# Patient Record
Sex: MALE | Race: WHITE | NOT HISPANIC OR LATINO
[De-identification: names, ages, dates, MRNs, and addresses within clinical notes are randomized per-mention and may not be internally consistent; named-entity substitution may affect disease eponyms.]

---

## 2020-05-13 ENCOUNTER — TRANSCRIPTION ENCOUNTER (OUTPATIENT)
Age: 40
End: 2020-05-13

## 2020-12-19 ENCOUNTER — TRANSCRIPTION ENCOUNTER (OUTPATIENT)
Age: 40
End: 2020-12-19

## 2021-05-05 ENCOUNTER — TRANSCRIPTION ENCOUNTER (OUTPATIENT)
Age: 41
End: 2021-05-05

## 2021-11-29 ENCOUNTER — TRANSCRIPTION ENCOUNTER (OUTPATIENT)
Age: 41
End: 2021-11-29

## 2022-04-02 ENCOUNTER — TRANSCRIPTION ENCOUNTER (OUTPATIENT)
Age: 42
End: 2022-04-02

## 2022-04-14 ENCOUNTER — TRANSCRIPTION ENCOUNTER (OUTPATIENT)
Age: 42
End: 2022-04-14

## 2022-12-04 ENCOUNTER — NON-APPOINTMENT (OUTPATIENT)
Age: 42
End: 2022-12-04

## 2023-05-24 ENCOUNTER — NON-APPOINTMENT (OUTPATIENT)
Age: 43
End: 2023-05-24

## 2023-05-30 PROBLEM — Z00.00 ENCOUNTER FOR PREVENTIVE HEALTH EXAMINATION: Status: ACTIVE | Noted: 2023-05-30

## 2023-06-03 ENCOUNTER — NON-APPOINTMENT (OUTPATIENT)
Age: 43
End: 2023-06-03

## 2023-06-08 ENCOUNTER — NON-APPOINTMENT (OUTPATIENT)
Age: 43
End: 2023-06-08

## 2023-06-08 ENCOUNTER — APPOINTMENT (OUTPATIENT)
Dept: INTERNAL MEDICINE | Facility: CLINIC | Age: 43
End: 2023-06-08
Payer: COMMERCIAL

## 2023-06-08 VITALS
OXYGEN SATURATION: 99 % | SYSTOLIC BLOOD PRESSURE: 132 MMHG | DIASTOLIC BLOOD PRESSURE: 82 MMHG | HEIGHT: 70 IN | TEMPERATURE: 98 F | WEIGHT: 200 LBS | BODY MASS INDEX: 28.63 KG/M2 | HEART RATE: 84 BPM

## 2023-06-08 DIAGNOSIS — A69.20 LYME DISEASE, UNSPECIFIED: ICD-10-CM

## 2023-06-08 DIAGNOSIS — Z87.891 PERSONAL HISTORY OF NICOTINE DEPENDENCE: ICD-10-CM

## 2023-06-08 DIAGNOSIS — Z83.518 FAMILY HISTORY OF OTHER SPECIFIED EYE DISORDER: ICD-10-CM

## 2023-06-08 PROCEDURE — 99205 OFFICE O/P NEW HI 60 MIN: CPT | Mod: 25

## 2023-06-08 PROCEDURE — 36415 COLL VENOUS BLD VENIPUNCTURE: CPT

## 2023-06-08 PROCEDURE — 93000 ELECTROCARDIOGRAM COMPLETE: CPT

## 2023-06-09 LAB
ALBUMIN SERPL ELPH-MCNC: 4.6 G/DL
ALP BLD-CCNC: 49 U/L
ALT SERPL-CCNC: 37 U/L
ANION GAP SERPL CALC-SCNC: 9 MMOL/L
AST SERPL-CCNC: 21 U/L
BILIRUB SERPL-MCNC: 0.2 MG/DL
BUN SERPL-MCNC: 14 MG/DL
CALCIUM SERPL-MCNC: 9.3 MG/DL
CHLORIDE SERPL-SCNC: 105 MMOL/L
CO2 SERPL-SCNC: 27 MMOL/L
CREAT SERPL-MCNC: 0.79 MG/DL
CRP SERPL-MCNC: <3 MG/L
EGFR: 114 ML/MIN/1.73M2
ERYTHROCYTE [SEDIMENTATION RATE] IN BLOOD BY WESTERGREN METHOD: 2 MM/HR
GLUCOSE SERPL-MCNC: 86 MG/DL
POTASSIUM SERPL-SCNC: 4.6 MMOL/L
PROT SERPL-MCNC: 6.5 G/DL
SODIUM SERPL-SCNC: 142 MMOL/L

## 2023-06-09 RX ORDER — DOXYCYCLINE HYCLATE 100 MG/1
100 CAPSULE ORAL TWICE DAILY
Qty: 14 | Refills: 0 | Status: ACTIVE | COMMUNITY
Start: 2023-06-09 | End: 1900-01-01

## 2023-06-13 ENCOUNTER — APPOINTMENT (OUTPATIENT)
Dept: INTERNAL MEDICINE | Facility: CLINIC | Age: 43
End: 2023-06-13

## 2023-06-14 ENCOUNTER — OFFICE (OUTPATIENT)
Dept: URBAN - METROPOLITAN AREA CLINIC 63 | Facility: CLINIC | Age: 43
Setting detail: OPHTHALMOLOGY
End: 2023-06-14
Payer: COMMERCIAL

## 2023-06-14 DIAGNOSIS — H43.393: ICD-10-CM

## 2023-06-14 DIAGNOSIS — G43.809: ICD-10-CM

## 2023-06-14 PROCEDURE — 92004 COMPRE OPH EXAM NEW PT 1/>: CPT | Performed by: STUDENT IN AN ORGANIZED HEALTH CARE EDUCATION/TRAINING PROGRAM

## 2023-06-14 ASSESSMENT — AXIALLENGTH_DERIVED
OS_AL: 23.1308
OD_AL: 23.2194

## 2023-06-14 ASSESSMENT — TONOMETRY
OS_IOP_MMHG: 20
OD_IOP_MMHG: 21

## 2023-06-14 ASSESSMENT — CONFRONTATIONAL VISUAL FIELD TEST (CVF)
OD_FINDINGS: FULL
OS_FINDINGS: FULL

## 2023-06-14 ASSESSMENT — VISUAL ACUITY
OS_BCVA: 20/20
OD_BCVA: 20/20

## 2023-06-14 ASSESSMENT — SPHEQUIV_DERIVED
OS_SPHEQUIV: 0.5
OD_SPHEQUIV: 0.5

## 2023-06-14 ASSESSMENT — REFRACTION_AUTOREFRACTION
OD_CYLINDER: -0.50
OS_SPHERE: +0.75
OD_SPHERE: +0.75
OS_AXIS: 109
OS_CYLINDER: -0.50
OD_AXIS: 087

## 2023-06-14 ASSESSMENT — KERATOMETRY
OS_K1POWER_DIOPTERS: 44.00
OS_AXISANGLE_DEGREES: 019
OD_K1POWER_DIOPTERS: 43.75
OD_K2POWER_DIOPTERS: 44.25
OS_K2POWER_DIOPTERS: 44.50
OD_AXISANGLE_DEGREES: 001

## 2023-06-15 ENCOUNTER — NON-APPOINTMENT (OUTPATIENT)
Age: 43
End: 2023-06-15

## 2023-06-16 ENCOUNTER — EMERGENCY (EMERGENCY)
Facility: HOSPITAL | Age: 43
LOS: 1 days | Discharge: HOME | End: 2023-06-16
Attending: STUDENT IN AN ORGANIZED HEALTH CARE EDUCATION/TRAINING PROGRAM | Admitting: STUDENT IN AN ORGANIZED HEALTH CARE EDUCATION/TRAINING PROGRAM
Payer: COMMERCIAL

## 2023-06-16 VITALS
OXYGEN SATURATION: 97 % | HEART RATE: 89 BPM | RESPIRATION RATE: 18 BRPM | SYSTOLIC BLOOD PRESSURE: 131 MMHG | TEMPERATURE: 98 F | DIASTOLIC BLOOD PRESSURE: 56 MMHG

## 2023-06-16 PROBLEM — A69.20 LYME DISEASE: Status: ACTIVE | Noted: 2023-06-08

## 2023-06-16 PROBLEM — Z87.891 FORMER SMOKER: Status: ACTIVE | Noted: 2023-06-16

## 2023-06-16 PROBLEM — Z83.518 FAMILY HISTORY OF MACULAR DEGENERATION: Status: ACTIVE | Noted: 2023-06-16

## 2023-06-16 LAB
ALBUMIN SERPL ELPH-MCNC: 4.1 G/DL — SIGNIFICANT CHANGE UP (ref 3.3–5)
ALP SERPL-CCNC: 52 U/L — SIGNIFICANT CHANGE UP (ref 30–120)
ALT FLD-CCNC: 52 U/L DA — SIGNIFICANT CHANGE UP (ref 10–60)
ANION GAP SERPL CALC-SCNC: 7 MMOL/L — SIGNIFICANT CHANGE UP (ref 5–17)
APTT BLD: 30.5 SEC — SIGNIFICANT CHANGE UP (ref 27.5–35.5)
AST SERPL-CCNC: 24 U/L — SIGNIFICANT CHANGE UP (ref 10–40)
BASOPHILS # BLD AUTO: 0.03 K/UL — SIGNIFICANT CHANGE UP (ref 0–0.2)
BASOPHILS NFR BLD AUTO: 0.3 % — SIGNIFICANT CHANGE UP (ref 0–2)
BILIRUB SERPL-MCNC: 0.4 MG/DL — SIGNIFICANT CHANGE UP (ref 0.2–1.2)
BUN SERPL-MCNC: 12 MG/DL — SIGNIFICANT CHANGE UP (ref 7–23)
CALCIUM SERPL-MCNC: 8.8 MG/DL — SIGNIFICANT CHANGE UP (ref 8.4–10.5)
CHLORIDE SERPL-SCNC: 103 MMOL/L — SIGNIFICANT CHANGE UP (ref 96–108)
CO2 SERPL-SCNC: 28 MMOL/L — SIGNIFICANT CHANGE UP (ref 22–31)
CREAT SERPL-MCNC: 0.82 MG/DL — SIGNIFICANT CHANGE UP (ref 0.5–1.3)
EGFR: 113 ML/MIN/1.73M2 — SIGNIFICANT CHANGE UP
EOSINOPHIL # BLD AUTO: 0.16 K/UL — SIGNIFICANT CHANGE UP (ref 0–0.5)
EOSINOPHIL NFR BLD AUTO: 1.6 % — SIGNIFICANT CHANGE UP (ref 0–6)
GLUCOSE BLDC GLUCOMTR-MCNC: 100 MG/DL — HIGH (ref 70–99)
GLUCOSE SERPL-MCNC: 105 MG/DL — HIGH (ref 70–99)
HCT VFR BLD CALC: 43.4 % — SIGNIFICANT CHANGE UP (ref 39–50)
HGB BLD-MCNC: 14.5 G/DL — SIGNIFICANT CHANGE UP (ref 13–17)
IMM GRANULOCYTES NFR BLD AUTO: 0.3 % — SIGNIFICANT CHANGE UP (ref 0–0.9)
INR BLD: 1.05 RATIO — SIGNIFICANT CHANGE UP (ref 0.88–1.16)
LYMPHOCYTES # BLD AUTO: 2.33 K/UL — SIGNIFICANT CHANGE UP (ref 1–3.3)
LYMPHOCYTES # BLD AUTO: 23.9 % — SIGNIFICANT CHANGE UP (ref 13–44)
MAGNESIUM SERPL-MCNC: 2.1 MG/DL — SIGNIFICANT CHANGE UP (ref 1.6–2.6)
MCHC RBC-ENTMCNC: 29.5 PG — SIGNIFICANT CHANGE UP (ref 27–34)
MCHC RBC-ENTMCNC: 33.4 GM/DL — SIGNIFICANT CHANGE UP (ref 32–36)
MCV RBC AUTO: 88.4 FL — SIGNIFICANT CHANGE UP (ref 80–100)
MONOCYTES # BLD AUTO: 0.48 K/UL — SIGNIFICANT CHANGE UP (ref 0–0.9)
MONOCYTES NFR BLD AUTO: 4.9 % — SIGNIFICANT CHANGE UP (ref 2–14)
NEUTROPHILS # BLD AUTO: 6.73 K/UL — SIGNIFICANT CHANGE UP (ref 1.8–7.4)
NEUTROPHILS NFR BLD AUTO: 69 % — SIGNIFICANT CHANGE UP (ref 43–77)
NRBC # BLD: 0 /100 WBCS — SIGNIFICANT CHANGE UP (ref 0–0)
PLATELET # BLD AUTO: 261 K/UL — SIGNIFICANT CHANGE UP (ref 150–400)
POTASSIUM SERPL-MCNC: 4.1 MMOL/L — SIGNIFICANT CHANGE UP (ref 3.5–5.3)
POTASSIUM SERPL-SCNC: 4.1 MMOL/L — SIGNIFICANT CHANGE UP (ref 3.5–5.3)
PROT SERPL-MCNC: 7.3 G/DL — SIGNIFICANT CHANGE UP (ref 6–8.3)
PROTHROM AB SERPL-ACNC: 12.4 SEC — SIGNIFICANT CHANGE UP (ref 10.5–13.4)
RBC # BLD: 4.91 M/UL — SIGNIFICANT CHANGE UP (ref 4.2–5.8)
RBC # FLD: 12.7 % — SIGNIFICANT CHANGE UP (ref 10.3–14.5)
SODIUM SERPL-SCNC: 138 MMOL/L — SIGNIFICANT CHANGE UP (ref 135–145)
WBC # BLD: 9.76 K/UL — SIGNIFICANT CHANGE UP (ref 3.8–10.5)
WBC # FLD AUTO: 9.76 K/UL — SIGNIFICANT CHANGE UP (ref 3.8–10.5)

## 2023-06-16 PROCEDURE — 70498 CT ANGIOGRAPHY NECK: CPT | Mod: 26,MA

## 2023-06-16 PROCEDURE — 85730 THROMBOPLASTIN TIME PARTIAL: CPT

## 2023-06-16 PROCEDURE — 99285 EMERGENCY DEPT VISIT HI MDM: CPT | Mod: 25

## 2023-06-16 PROCEDURE — 70496 CT ANGIOGRAPHY HEAD: CPT | Mod: 26,MA

## 2023-06-16 PROCEDURE — 70450 CT HEAD/BRAIN W/O DYE: CPT | Mod: 26,MA,59

## 2023-06-16 PROCEDURE — 85610 PROTHROMBIN TIME: CPT

## 2023-06-16 PROCEDURE — 85025 COMPLETE CBC W/AUTO DIFF WBC: CPT

## 2023-06-16 PROCEDURE — 82962 GLUCOSE BLOOD TEST: CPT

## 2023-06-16 PROCEDURE — 93005 ELECTROCARDIOGRAM TRACING: CPT

## 2023-06-16 PROCEDURE — 83735 ASSAY OF MAGNESIUM: CPT

## 2023-06-16 PROCEDURE — 70498 CT ANGIOGRAPHY NECK: CPT | Mod: MA

## 2023-06-16 PROCEDURE — 80053 COMPREHEN METABOLIC PANEL: CPT

## 2023-06-16 PROCEDURE — 70450 CT HEAD/BRAIN W/O DYE: CPT | Mod: MA

## 2023-06-16 PROCEDURE — 36415 COLL VENOUS BLD VENIPUNCTURE: CPT

## 2023-06-16 PROCEDURE — 86780 TREPONEMA PALLIDUM: CPT

## 2023-06-16 PROCEDURE — 93010 ELECTROCARDIOGRAM REPORT: CPT

## 2023-06-16 PROCEDURE — 96360 HYDRATION IV INFUSION INIT: CPT | Mod: XU

## 2023-06-16 PROCEDURE — 99285 EMERGENCY DEPT VISIT HI MDM: CPT

## 2023-06-16 PROCEDURE — 70496 CT ANGIOGRAPHY HEAD: CPT | Mod: MA

## 2023-06-16 RX ORDER — GABAPENTIN 400 MG/1
1 CAPSULE ORAL
Qty: 90 | Refills: 0
Start: 2023-06-16 | End: 2023-07-15

## 2023-06-16 RX ORDER — SODIUM CHLORIDE 9 MG/ML
1000 INJECTION INTRAMUSCULAR; INTRAVENOUS; SUBCUTANEOUS ONCE
Refills: 0 | Status: COMPLETED | OUTPATIENT
Start: 2023-06-16 | End: 2023-06-16

## 2023-06-16 RX ADMIN — SODIUM CHLORIDE 1000 MILLILITER(S): 9 INJECTION INTRAMUSCULAR; INTRAVENOUS; SUBCUTANEOUS at 10:53

## 2023-06-16 RX ADMIN — SODIUM CHLORIDE 1000 MILLILITER(S): 9 INJECTION INTRAMUSCULAR; INTRAVENOUS; SUBCUTANEOUS at 12:03

## 2023-06-16 NOTE — ED PROVIDER NOTE - CARE PROVIDER_API CALL
Jorge Luis Cee  Neurology  [address] [place] [address]  Phone: [phone]  Fax: [phone]  Follow Up Time: 1-3 Days

## 2023-06-16 NOTE — CONSULT NOTE ADULT - SUBJECTIVE AND OBJECTIVE BOX
Patient is a 42y old  Male who presents with a chief complaint of Paresthesia    HPI:  42-year-old male history of Lyme disease here complaining of 1 week of bilateral arm and leg numbness and tingling.  All 4 extremities sensation starts midway up extremity and extends down distally.  Patient also with central facial numbness and right-sided head pressure behind right eye.  Patient reports 3 weeks ago having headache facial spasm and felt similar to when he had Lyme disease in the past.  He went to urgent care had a positive Lyme test was started on doxycycline which she has been taking for about 3 weeks with 3 days of treatment left.  Patient reports she went to a [facility] infectious disease doctor who said she did not think it was Lyme disease but patient finished antibiotics.  Patient denies fevers and chills, chest pain, shortness of breath, nausea, vomiting, abdominal pain, bowel or bladder incontinence or retention, saddle anesthesia. No difficulty speaking or word finding. Patient with chronic back pain that he feels is slightly worse however no reported acute injury or trauma.      PAST MEDICAL & SURGICAL HISTORY: Lyme's disease    Allergies    No Known Allergies    SOCIAL HISTORY:    No h/o Smoking.   No h/o alcohol use.    FAMILY HISTORY:    REVIEW OF SYSTEMS:    CONSTITUTIONAL: No fever  EYES: No eye pain,   ENMT:  No sinus or throat pain  NECK: No pain or stiffness  RESPIRATORY: No cough, No hemoptysis; No shortness of breath  CARDIOVASCULAR: No acute chest pain, palpitations,  or leg swelling  GASTROINTESTINAL: No abdominal pain. No nausea, vomiting, or hematemesis;  No melena or hematochezia.  GENITOURINARY: No  hematuria, or incontinence  MUSCULOSKELETAL: No joint swelling; No extremity pain  SKIN: No itching, rashes, or lesions   LYMPH NODES: No enlarged glands  NEUROLOGICAL: No headaches, memory loss,   PSYCHIATRIC: No depression, anxiety, mood swings, or difficulty sleeping  ENDOCRINE: No heat or cold intolerance;   HEME/LYMPH: No easy bruising, or bleeding gums  Allergy/Immunology. No medication allergy. No seasonal allergies.    PHYSICAL EXAM:  Vital Signs Last 24 Hrs  T(F): 98.1 (06-16-23 @ 09:26)  HR: 89 (06-16-23 @ 09:26)  BP: 131/56 (06-16-23 @ 09:26)  RR: 18 (06-16-23 @ 09:26)    GENERAL: NAD, well-groomed, well-developed  HEAD:  Atraumatic, Normocephalic  EYES: EOMI, PERRLA, conjunctiva and sclera clear  NECK: Supple, No JVD, thyroid non-palpable    On Neurological Examination:    Mental Status - Pt is alert, awake, oriented X3. Higher functions are intact. Pt. does have mild poor cognition. Follows commands well and able to answer questions appropriately.    Speech -  Normal. Slurred. Pt has no aphasia.    Cranial Nerves - Pupils 3 mm equal and reactive to light, extraocular eye movements intact. Pt has no visual field deficit.  Pt has no right left facial asymmetry. Tongue - is in midline.    Motor Exam - 4 plus/5 all over, No drift. No shaking or tremors.  Muscle tone - is normal all over. Moves all extremities equally. No asymmetry is seen.      Sensory Exam - Pin prick, temperature, joint position and vibration are intact on either side. Pt withdraws all extremities equally on stimulation. No asymmetry seen. No complaints of tingling, numbness.    Gait - Able to stand and walk unassisted. Pt is able to stand up with holding my hands and is able to walk for few feet around the bed. Not falling to either side.    Deep tendon Reflexes - 2 plus all over.    Coordination - Fine finger movements are normal on both sides. Finger to nose is also normal on both sides.       Romberg - Negative.    Neck Supple -  Yes.    LABS:                        14.5   9.76  )-----------( 261      ( 16 Jun 2023 10:25 )             43.4     06-16    138  |  103  |  12  ----------------------------<  105<H>  4.1   |  28  |  0.82    Ca    8.8      16 Jun 2023 10:25  Mg     2.1     06-16    TPro  7.3  /  Alb  4.1  /  TBili  0.4  /  DBili  x   /  AST  24  /  ALT  52  /  AlkPhos  52  06-16    PT/INR - ( 16 Jun 2023 10:25 )   PT: 12.4 sec;   INR: 1.05 ratio         PTT - ( 16 Jun 2023 10:25 )  PTT:30.5 sec      RADIOLOGY & ADDITIONAL STUDIES:    < from: CT Angio Head w/ IV Cont (06.16.23 @ 10:41) >    CT angiography neck: No hemodynamically significant stenosis of the bilateral cervical ICAs using NASCET criteria.  Patent vertebral arteries.  No evidence of vascular dissection.    CT angiography brain: No large vessel occlusion. No evidence of aneurysm.    < end of copied text >    < from: CT Head No Cont (06.16.23 @ 10:40) >    IMPRESSION:    No acute intracranial hemorrhage or mass effect.  Polypoid lesions in the right nasal cavity.     < end of copied text >     Patient is a 42y old  Male who presents with a chief complaint of Paresthesia    HPI:  42-year-old male history of Lyme disease here complaining of 1 week of bilateral arm and leg numbness and tingling.  All 4 extremities sensation starts midway up extremity and extends down distally.  Patient also with central facial numbness and right-sided head pressure behind right eye.  Patient reports 3 weeks ago having headache facial spasm and felt similar to when he had Lyme disease in the past.  He went to urgent care had a positive Lyme test was started on doxycycline which she has been taking for about 3 weeks with 3 days of treatment left.  Patient reports she went to a [facility] infectious disease doctor who said she did not think it was Lyme disease but patient finished antibiotics.  Patient denies fevers and chills, chest pain, shortness of breath, nausea, vomiting, abdominal pain, bowel or bladder incontinence or retention, saddle anesthesia. No difficulty speaking or word finding. Patient with chronic back pain that he feels is slightly worse however no reported acute injury or trauma.      PAST MEDICAL & SURGICAL HISTORY: Lyme's disease    Allergies    No Known Allergies    SOCIAL HISTORY:    No h/o Smoking.   No h/o alcohol use.    FAMILY HISTORY:    REVIEW OF SYSTEMS:    CONSTITUTIONAL: No fever  EYES: No eye pain,   ENMT:  No sinus or throat pain  NECK: No pain or stiffness  RESPIRATORY: No cough, No hemoptysis; No shortness of breath  CARDIOVASCULAR: No acute chest pain, palpitations,  or leg swelling  GASTROINTESTINAL: No abdominal pain. No nausea, vomiting, or hematemesis;  No melena or hematochezia.  GENITOURINARY: No  hematuria, or incontinence  MUSCULOSKELETAL: No joint swelling; No extremity pain  SKIN: No itching, rashes, or lesions   LYMPH NODES: No enlarged glands  NEUROLOGICAL: No headaches, memory loss,   PSYCHIATRIC: No depression, anxiety, mood swings, or difficulty sleeping  ENDOCRINE: No heat or cold intolerance;   HEME/LYMPH: No easy bruising, or bleeding gums  Allergy/Immunology. No medication allergy. No seasonal allergies.    PHYSICAL EXAM:  Vital Signs Last 24 Hrs  T(F): 98.1 (06-16-23 @ 09:26)  HR: 89 (06-16-23 @ 09:26)  BP: 131/56 (06-16-23 @ 09:26)  RR: 18 (06-16-23 @ 09:26)    GENERAL: NAD, well-groomed, well-developed  HEAD:  Atraumatic, Normocephalic  EYES: EOMI, PERRLA, conjunctiva and sclera clear  NECK: Supple, No JVD    On Neurological Examination:    Mental Status - Pt is alert, awake, oriented X3. Higher functions are intact. Follows commands well and able to answer questions appropriately.    Speech -  Normal.  Pt has no aphasia.    Cranial Nerves - Pupils 3 mm equal and reactive to light, extraocular eye movements intact. Pt has no visual field deficit.  No facial asymmetry. Tongue - is in midline.    Motor Exam - 4 plus/5 all over, No drift. No shaking or tremors.    Sensory Exam - Pin prick, temperature, joint position and vibration are intact on either side.    Gait - Able to stand and walk unassisted. Pt is able to stand up with holding my hands and is able to walk for few feet around the bed. Not falling to either side.    Deep tendon Reflexes - 2 plus all over.    Coordination - Fine finger movements are normal on both sides. Finger to nose is also normal on both sides.       Romberg - Negative.    Neck Supple -  Yes.    LABS:                        14.5   9.76  )-----------( 261      ( 16 Jun 2023 10:25 )             43.4     06-16    138  |  103  |  12  ----------------------------<  105<H>  4.1   |  28  |  0.82    Ca    8.8      16 Jun 2023 10:25  Mg     2.1     06-16    TPro  7.3  /  Alb  4.1  /  TBili  0.4  /  DBili  x   /  AST  24  /  ALT  52  /  AlkPhos  52  06-16    PT/INR - ( 16 Jun 2023 10:25 )   PT: 12.4 sec;   INR: 1.05 ratio         PTT - ( 16 Jun 2023 10:25 )  PTT:30.5 sec      RADIOLOGY & ADDITIONAL STUDIES:    < from: CT Angio Head w/ IV Cont (06.16.23 @ 10:41) >    CT angiography neck: No hemodynamically significant stenosis of the bilateral cervical ICAs using NASCET criteria.  Patent vertebral arteries.  No evidence of vascular dissection.    CT angiography brain: No large vessel occlusion. No evidence of aneurysm.    < end of copied text >    < from: CT Head No Cont (06.16.23 @ 10:40) >    IMPRESSION:    No acute intracranial hemorrhage or mass effect.  Polypoid lesions in the right nasal cavity.     < end of copied text >

## 2023-06-16 NOTE — ED PROVIDER NOTE - PHYSICAL EXAMINATION
Vital signs as available reviewed.  General:  No acute distress.  Head:  Normocephalic, atraumatic.  Eyes:  Conjunctiva pink, no icterus.  Cardiovascular:  Regular rate, no obvious murmur.  Respiratory:  Clear to auscultation, good air entry bilaterally.  Abdomen:  Soft, non-tender.  Musculoskeletal:  No midline spinal step offs, skin lesions. lower extremity strength 5/5 in flexion and extension of great toes, ankle, knees, hips. straight leg raise negative. Sensation intact.   Neurologic: Alert and oriented, follows commands, extra-occular movements intact, no visual fields defect, no facial asymmetry, no upper extremity drift, no lower extremity drift, rapid alternating movements performed without difficulty, no sensory deficit, no aphasia, no dysarthria, no inattention.   Skin:  Warm and dry.

## 2023-06-16 NOTE — PHYSICAL EXAM
[General Appearance - Alert] : alert [General Appearance - In No Acute Distress] : in no acute distress [General Appearance - Well Nourished] : well nourished [General Appearance - Well-Appearing] : healthy appearing [Sclera] : the sclera and conjunctiva were normal [PERRL With Normal Accommodation] : pupils were equal in size, round, reactive to light [Outer Ear] : the ears and nose were normal in appearance [Examination Of The Oral Cavity] : the lips and gums were normal [Oropharynx] : the oropharynx was normal with no thrush [Neck Appearance] : the appearance of the neck was normal [Neck Cervical Mass (___cm)] : no neck mass was observed [Respiration, Rhythm And Depth] : normal respiratory rhythm and effort [Exaggerated Use Of Accessory Muscles For Inspiration] : no accessory muscle use [Auscultation Breath Sounds / Voice Sounds] : lungs were clear to auscultation bilaterally [Heart Rate And Rhythm] : heart rate was normal and rhythm regular [Heart Sounds] : normal S1 and S2 [Murmurs] : no murmurs [Heart Sounds Pericardial Friction Rub] : no pericardial rub [Edema] : there was no peripheral edema [Abdomen Soft] : soft [Abdomen Tenderness] : non-tender [Abdomen Mass (___ Cm)] : no abdominal mass palpated [No Palpable Adenopathy] : no palpable adenopathy [Musculoskeletal - Swelling] : no joint swelling [Range of Motion to Joints] : range of motion to joints [Nail Clubbing] : no clubbing  or cyanosis of the fingernails [Motor Tone] : muscle strength and tone were normal [Skin Color & Pigmentation] : normal skin color and pigmentation [] : no rash [Skin Lesions] : no skin lesions [Cranial Nerves] : cranial nerves 2-12 were intact [Motor Exam] : the motor exam was normal [No Focal Deficits] : no focal deficits [Oriented To Time, Place, And Person] : oriented to person, place, and time [Affect] : the affect was normal

## 2023-06-16 NOTE — HISTORY OF PRESENT ILLNESS
[FreeTextEntry1] : 41 y/o M with history of Lyme disease in Aug 2020, presents for concern of Lyme reinfection. \par \par In Aug 2020, he developed bull-eye rash, left ankle painful, HA, back pain. He was treated with doxy for 3 weeks with resolution of symptoms \par \par Now presents with 4-6 weeks history of back stiffness, joint pain, left ankle, and right eye floaters. He felt symptoms were very similar to his previous episode, so he went to Urgent Care for testing. He was prescribed doxycycline (will complete 2 weeks on 6/12)\par \par PMH: Lyme in 2020 \par PSH: nose polyps. wisdom teeth \par \par Meds: doxycyline, finasteride  \par \par Allergy to Keflex - reported as a baby,  unknown reaction \par \par Former smoker (stopped Jan 2023)\par Social drinker\par \par Spending lots of time outdoors in [facility] \par

## 2023-06-16 NOTE — ED PROVIDER NOTE - NSFOLLOWUPINSTRUCTIONS_ED_ALL_ED_FT
Please follow up with your Primary Care Physician and any specialists as discussed- Dr. Cee  Please take your medications as prescribed and or instructed.  If your symptoms persist or worsen, please seek care. Either return to the Emergency Department, go to urgent care or see your primary care doctor.  Please refer to general information and instructions attached or below:     Peripheral Neuropathy  Peripheral neuropathy is a type of nerve damage. It affects nerves that carry signals between the spinal cord and the arms, legs, and the rest of the body (peripheral nerves). It does not affect nerves in the spinal cord or brain. In peripheral neuropathy, one nerve or a group of nerves may be damaged. Peripheral neuropathy is a broad category that includes many specific nerve disorders, like diabetic neuropathy, hereditary neuropathy, and carpal tunnel syndrome.    What are the causes?  This condition may be caused by:  Certain diseases, such as:  Diabetes. This is the most common cause of peripheral neuropathy.  Autoimmune diseases, such as rheumatoid arthritis and systemic lupus erythematosus.  Nerve diseases that are passed from parent to child (inherited).  Kidney disease.  Thyroid disease.  Other causes may include:  Nerve injury.  Pressure or stress on a nerve that lasts a long time.  Lack (deficiency) of B vitamins. This can result from alcoholism, poor diet, or a restricted diet.  Infections.  Some medicines, such as cancer medicines (chemotherapy).  Poisonous (toxic) substances, such as lead and mercury.  Too little blood flowing to the legs.  In some cases, the cause of this condition is not known.    What are the signs or symptoms?  Symptoms of this condition depend on which of your nerves is damaged.  Symptoms in the legs, hands, and arms can include:  Loss of feeling (numbness) in the feet, hands, or both.  Tingling in the feet, hands, or both.  Burning pain.  Very sensitive skin.  Weakness.  Not being able to move a part of the body (paralysis).  Clumsiness or poor coordination.  Muscle twitching.  Loss of balance.  Symptoms in other parts of the body can include:  Not being able to control your bladder.  Feeling dizzy.  Sexual problems.  How is this diagnosed?  Diagnosing and finding the cause of peripheral neuropathy can be difficult. Your health care provider will take your medical history and do a physical exam. A neurological exam will also be done. This involves checking things that are affected by your brain, spinal cord, and nerves (nervous system). For example, your health care provider will check your reflexes, how you move, and what you can feel.    You may have other tests, such as:  Blood tests.  Electromyogram (EMG) and nerve conduction tests. These tests check nerve function and how well the nerves are controlling the muscles.  Imaging tests, such as a CT scan or MRI, to rule out other causes of your symptoms.  Removing a small piece of nerve to be examined in a lab (nerve biopsy).  Removing and examining a small amount of the fluid that surrounds the brain and spinal cord (lumbar puncture).  How is this treated?  Treatment for this condition may involve:  Treating the underlying cause of the neuropathy, such as diabetes, kidney disease, or vitamin deficiencies.  Stopping medicines that can cause neuropathy, such as chemotherapy.  Medicine to help relieve pain. Medicines may include:  Prescription or over-the-counter pain medicine.  Anti-seizure medicine.  Antidepressants.  Pain-relieving patches that are applied to painful areas of skin.  Surgery to relieve pressure on a nerve or to destroy a nerve that is causing pain.  Physical therapy to help improve movement and balance.  Devices to help you move around (assistive devices).  Follow these instructions at home:  Medicines    Take over-the-counter and prescription medicines only as told by your health care provider. Do not take any other medicines without first asking your health care provider.  Ask your health care provider if the medicine prescribed to you requires you to avoid driving or using machinery.  Lifestyle    A plate along with examples of foods in a healthy diet.  Do not use any products that contain nicotine or tobacco. These products include cigarettes, chewing tobacco, and vaping devices, such as e-cigarettes. Smoking keeps blood from reaching damaged nerves. If you need help quitting, ask your health care provider.  Avoid or limit alcohol. Too much alcohol can cause a vitamin B deficiency, and vitamin B is needed for healthy nerves.  Eat a healthy diet. This includes:  Eating foods that are high in fiber, such as beans, whole grains, and fresh fruits and vegetables.  Limiting foods that are high in fat and processed sugars, such as fried or sweet foods.  General instructions    Barefoot person sitting with right leg crossed over left, using handheld mirror to see bottom of right foot.  If you have diabetes, work closely with your health care provider to keep your blood sugar under control.  If you have numbness in your feet:  Check every day for signs of injury or infection. Watch for redness, warmth, and swelling.  Wear padded socks and comfortable shoes. These help protect your feet.  Develop a good support system. Living with peripheral neuropathy can be stressful. Consider talking with a mental health specialist or joining a support group.  Use assistive devices and attend physical therapy as told by your health care provider. This may include using a walker or a cane.  Keep all follow-up visits. This is important.  Where to find more information  [place] of Neurological Disorders: www.ninds.nih.gov  Contact a health care provider if:  You have new signs or symptoms of peripheral neuropathy.  You are struggling emotionally from dealing with peripheral neuropathy.  Your pain is not well controlled.  Get help right away if:  You have an injury or infection that is not healing normally.  You develop new weakness in an arm or leg.  You have fallen or do so frequently.  Summary  Peripheral neuropathy is when the nerves in the arms or legs are damaged, resulting in numbness, weakness, or pain.  There are many causes of peripheral neuropathy, including diabetes, pinched nerves, vitamin deficiencies, autoimmune disease, and hereditary conditions.  Diagnosing and finding the cause of peripheral neuropathy can be difficult. Your health care provider will take your medical history, do a physical exam, and do tests, including blood tests and nerve function tests.  Treatment involves treating the underlying cause of the neuropathy and taking medicines to help control pain. Physical therapy and assistive devices may also help.  This information is not intended to replace advice given to you by your health care provider. Make sure you discuss any questions you have with your health care provider.

## 2023-06-16 NOTE — ED PROVIDER NOTE - CLINICAL SUMMARY MEDICAL DECISION MAKING FREE TEXT BOX
here complaining of bilateral hand, feet, facial numbness, not progressive. possibly in setting of lyme disease. check labs, get CTs, get neuro evaluation, chart review. fever

## 2023-06-16 NOTE — CONSULT NOTE ADULT - ASSESSMENT
Generalized paresthesia  H/o Lyme's disease.  No confusion. No seizure.  D/D  Demyelinating disease  Migraine  CNS lyme's dose - Pt already is treated for 3 weeks Doxy - 3 day left  - pt to continue doxy  Neurontin 300 mg TID  F/up with neurologist Dr. Cee - [phone]  Rec MRI Brain pre/post with gado  If symptoms don't improve  -Spinal tap  D/w pt/wife/mother  D/w Dr. Webster

## 2023-06-16 NOTE — DATA REVIEWED
[FreeTextEntry1] : 5/25 \par RMSF serology neg \par Lyme IgG - positive\par Lyme IgM negative \par Babesia IgG positive\par Babesia PCR neg \par Ehrlichia serology neg\par Anaplsma serology pending

## 2023-06-16 NOTE — ED ADULT NURSE NOTE - NSFALLUNIVINTERV_ED_ALL_ED
Bed/Stretcher in lowest position, wheels locked, appropriate side rails in place/Call bell, personal items and telephone in reach/Instruct patient to call for assistance before getting out of bed/chair/stretcher/Non-slip footwear applied when patient is off stretcher/Noti to call system/Physically safe environment - no spills, clutter or unnecessary equipment/Purposeful proactive rounding/Room/bathroom lighting operational, light cord in reach

## 2023-06-16 NOTE — ED PROVIDER NOTE - OBJECTIVE STATEMENT
42-year-old male history of Lyme disease here complaining of 1 week of bilateral arm and leg numbness and tingling.  All 4 extremities sensation starts midway up extremity and extends down distally.  Patient also with central facial numbness and right-sided head pressure behind right eye.  Patient reports 3 weeks ago having headache facial spasm and felt similar to when he had Lyme disease in the past.  He went to urgent care had a positive Lyme test was started on doxycycline which she has been taking for about 3 weeks with 3 days of treatment left.  Patient reports she went to a [facility] infectious disease doctor who said she did not think it was Lyme disease but patient finished antibiotics.  Patient denies fevers and chills, chest pain, shortness of breath, nausea, vomiting, abdominal pain, bowel or bladder incontinence or retention, saddle anesthesia. No difficulty speaking or word finding. Patient with chronic back pain that he feels is slightly worse however no reported acute injury or trauma.    PMD is Dr. Langston    ID is Dr. Humble Wen

## 2023-06-16 NOTE — ED PROVIDER NOTE - PROGRESS NOTE DETAILS
Seen by neurology, recommending patient finish doxycycline as prescribed.  Recommending gabapentin 300 mg 3 times daily.  Patient to follow-up with Dr. Cee as outpatient.-Patient made aware, will discharge.

## 2023-06-16 NOTE — ED PROVIDER NOTE - PATIENT'S PREFERRED PRONOUN
Post-Care Instructions: I reviewed with the patient in detail post-care instructions. Patient is to wear sunprotection, and avoid picking at any of the treated lesions. Pt may apply Vaseline to crusted or scabbing areas.
Render Post-Care Instructions In Note?: no
Consent: The patient's consent was obtained including but not limited to risks of crusting, scabbing, blistering, scarring, darker or lighter pigmentary change, recurrence, incomplete removal and infection.
Detail Level: Zone
Him/He
Duration Of Freeze Thaw-Cycle (Seconds): 0
Total Number Of Aks Treated: 3

## 2023-06-16 NOTE — ED ADULT NURSE NOTE - OBJECTIVE STATEMENT
41 y/o male received aox4 ambulatory c/o intermittent bouts of facial and upper extremity numbness/tingling over the past week. denies n/v/d/sob/cp. no neuro deficits noted during triage. IVL inserted, bloods dranw and sent  to lab.

## 2023-06-16 NOTE — ASSESSMENT
[FreeTextEntry1] : 41 y/o M with history of Lyme disease in Aug 2020 treated with doxycycline for 3 weeks, presents with non-specific musculoskeletal symptoms and malaise for 4-6 weeks. \par \par Recent test results notable for positive IgG Lyme serology. Discussed in detail that these results are difficult to interpret in patients with prior Lyme disease as antibodies can persist for years. His exam is normal, without acute findings suggestive of reinfection (rash, nerve palsy, joint swelling, etc.). ECG is normal. Other tick-borne illness testing were unremarkable, including negative Babesia PCR in the setting of positive IgG. \par \par After almost 2 weeks with doxycycline, most of his symptoms have improved but not completely resolve. It is reasonable to complete 3 weeks, however unclear if current presentation is could be related to Lyme reinfection. Will check labs today to evaluate for other abnormalities seen in tick-borne illnesses. Will communicate results over the phone. \par \par He may follow up with me on an as needed basis. All questions addressed with patient who verbalized understanding and agreement with plan.

## 2023-06-16 NOTE — ED PROVIDER NOTE - PATIENT PORTAL LINK FT
You can access the FollowMyHealth Patient Portal offered by [facility] by registering at the following website: [email or web address]. By joining [email or web address]’s FollowMyHealth portal, you will also be able to view your health information using other applications (apps) compatible with our system.

## 2023-06-17 LAB — T PALLIDUM AB TITR SER: NEGATIVE — SIGNIFICANT CHANGE UP

## 2023-06-21 ENCOUNTER — TRANSCRIPTION ENCOUNTER (OUTPATIENT)
Age: 43
End: 2023-06-21

## 2023-06-21 ENCOUNTER — OUTPATIENT (OUTPATIENT)
Dept: OUTPATIENT SERVICES | Facility: HOSPITAL | Age: 43
LOS: 1 days | End: 2023-06-21
Payer: COMMERCIAL

## 2023-06-21 ENCOUNTER — APPOINTMENT (OUTPATIENT)
Dept: MRI IMAGING | Facility: CLINIC | Age: 43
End: 2023-06-21

## 2023-06-21 ENCOUNTER — APPOINTMENT (OUTPATIENT)
Dept: MRI IMAGING | Facility: CLINIC | Age: 43
End: 2023-06-21
Payer: COMMERCIAL

## 2023-06-21 DIAGNOSIS — Z00.8 ENCOUNTER FOR OTHER GENERAL EXAMINATION: ICD-10-CM

## 2023-06-21 PROCEDURE — 72156 MRI NECK SPINE W/O & W/DYE: CPT | Mod: 26

## 2023-06-21 PROCEDURE — A9585: CPT

## 2023-06-21 PROCEDURE — 70553 MRI BRAIN STEM W/O & W/DYE: CPT

## 2023-06-21 PROCEDURE — 70553 MRI BRAIN STEM W/O & W/DYE: CPT | Mod: 26

## 2023-06-21 PROCEDURE — 72156 MRI NECK SPINE W/O & W/DYE: CPT

## 2023-07-07 ENCOUNTER — LABORATORY RESULT (OUTPATIENT)
Age: 43
End: 2023-07-07

## 2023-07-08 ENCOUNTER — APPOINTMENT (OUTPATIENT)
Dept: MRI IMAGING | Facility: HOSPITAL | Age: 43
End: 2023-07-08
Payer: COMMERCIAL

## 2023-07-08 ENCOUNTER — OUTPATIENT (OUTPATIENT)
Dept: OUTPATIENT SERVICES | Facility: HOSPITAL | Age: 43
LOS: 1 days | End: 2023-07-08
Payer: COMMERCIAL

## 2023-07-08 DIAGNOSIS — Z00.8 ENCOUNTER FOR OTHER GENERAL EXAMINATION: ICD-10-CM

## 2023-07-08 PROCEDURE — 72157 MRI CHEST SPINE W/O & W/DYE: CPT | Mod: 26

## 2023-07-08 PROCEDURE — A9579: CPT

## 2023-07-08 PROCEDURE — 72157 MRI CHEST SPINE W/O & W/DYE: CPT

## 2023-07-10 ENCOUNTER — APPOINTMENT (OUTPATIENT)
Dept: RADIOLOGY | Facility: CLINIC | Age: 43
End: 2023-07-10
Payer: COMMERCIAL

## 2023-07-10 ENCOUNTER — NON-APPOINTMENT (OUTPATIENT)
Age: 43
End: 2023-07-10

## 2023-07-10 ENCOUNTER — APPOINTMENT (OUTPATIENT)
Dept: INTERNAL MEDICINE | Facility: CLINIC | Age: 43
End: 2023-07-10

## 2023-07-10 ENCOUNTER — APPOINTMENT (OUTPATIENT)
Dept: ULTRASOUND IMAGING | Facility: CLINIC | Age: 43
End: 2023-07-10
Payer: COMMERCIAL

## 2023-07-10 LAB
B BURGDOR AB SER-IMP: POSITIVE
B BURGDOR IGG+IGM SER QL: 1.32 INDEX

## 2023-07-10 PROCEDURE — 71046 X-RAY EXAM CHEST 2 VIEWS: CPT

## 2023-07-10 PROCEDURE — 76536 US EXAM OF HEAD AND NECK: CPT

## 2023-07-11 ENCOUNTER — OUTPATIENT (OUTPATIENT)
Dept: OUTPATIENT SERVICES | Facility: HOSPITAL | Age: 43
LOS: 1 days | End: 2023-07-11
Payer: COMMERCIAL

## 2023-07-11 ENCOUNTER — APPOINTMENT (OUTPATIENT)
Dept: MRI IMAGING | Facility: CLINIC | Age: 43
End: 2023-07-11
Payer: COMMERCIAL

## 2023-07-11 DIAGNOSIS — Z00.8 ENCOUNTER FOR OTHER GENERAL EXAMINATION: ICD-10-CM

## 2023-07-11 PROCEDURE — 72158 MRI LUMBAR SPINE W/O & W/DYE: CPT | Mod: 26

## 2023-07-11 PROCEDURE — A9585: CPT

## 2023-07-11 PROCEDURE — 72158 MRI LUMBAR SPINE W/O & W/DYE: CPT

## 2023-07-17 ENCOUNTER — APPOINTMENT (OUTPATIENT)
Dept: CT IMAGING | Facility: CLINIC | Age: 43
End: 2023-07-17
Payer: COMMERCIAL

## 2023-07-17 PROCEDURE — 70486 CT MAXILLOFACIAL W/O DYE: CPT

## 2023-07-20 ENCOUNTER — NON-APPOINTMENT (OUTPATIENT)
Age: 43
End: 2023-07-20

## 2023-07-20 LAB
A PHAGOCYTOPH IGG TITR SER IF: NORMAL TITER
B BURGDOR AB SER QL IA: POSITIVE
B MICROTI IGG TITR SER: ABNORMAL TITER
E CHAFFEENSIS IGG TITR SER IF: NORMAL TITER

## 2023-07-20 NOTE — ASU PATIENT PROFILE, ADULT - FALL HARM RISK - UNIVERSAL INTERVENTIONS
Bed in lowest position, wheels locked, appropriate side rails in place/Call bell, personal items and telephone in reach/Instruct patient to call for assistance before getting out of bed or chair/Non-slip footwear when patient is out of bed/[place] to call system/Physically safe environment - no spills, clutter or unnecessary equipment/Purposeful Proactive Rounding/Room/bathroom lighting operational, light cord in reach

## 2023-07-20 NOTE — ASU PATIENT PROFILE, ADULT - NSICDXPASTMEDICALHX_GEN_ALL_CORE_FT
PAST MEDICAL HISTORY:  Numbness of leg      PAST MEDICAL HISTORY:  Lyme disease 2020    Numbness of leg

## 2023-07-25 ENCOUNTER — APPOINTMENT (OUTPATIENT)
Dept: ORTHOPEDIC SURGERY | Facility: CLINIC | Age: 43
End: 2023-07-25
Payer: COMMERCIAL

## 2023-07-25 VITALS
BODY MASS INDEX: 28.63 KG/M2 | SYSTOLIC BLOOD PRESSURE: 126 MMHG | WEIGHT: 200 LBS | DIASTOLIC BLOOD PRESSURE: 71 MMHG | HEART RATE: 78 BPM | HEIGHT: 70 IN

## 2023-07-25 DIAGNOSIS — M60.9 MYOSITIS, UNSPECIFIED: ICD-10-CM

## 2023-07-25 DIAGNOSIS — M47.812 SPONDYLOSIS W/OUT MYELOPATHY OR RADICULOPATHY, CERVICAL REGION: ICD-10-CM

## 2023-07-25 PROCEDURE — 99204 OFFICE O/P NEW MOD 45 MIN: CPT

## 2023-07-25 RX ORDER — METHYLPREDNISOLONE 4 MG/1
4 TABLET ORAL
Qty: 1 | Refills: 0 | Status: ACTIVE | COMMUNITY
Start: 2023-07-25 | End: 1900-01-01

## 2023-07-25 RX ORDER — NAPROXEN 500 MG/1
500 TABLET ORAL
Qty: 60 | Refills: 1 | Status: ACTIVE | COMMUNITY
Start: 2023-07-25 | End: 1900-01-01

## 2023-07-25 NOTE — PHYSICAL EXAM
[Normal] : Gait: normal [de-identified] : CONSTITUTIONAL:  Patient is a very pleasant individual who is well-nourished and appears stated age. \par PSYCHIATRIC:  Alert and oriented times three and in no apparent distress, and participates with orthopedic evaluation well.\par HEAD:  Atraumatic and  nonsyndromic in appearance.\par EENT: No thyromegaly, EOMI.\par RESPIRATORY:  Respiratory rate is regular, not dyspneic on examination.\par LYMPHATICS:  There is no cervical or axillary lymphadenopathy.\par INTEGUMENTARY:  Skin is clean, dry, and intact about the bilateral upper extremities and cervical spine. \par VASCULAR:   There is brisk capillary refill about the bilateral upper extremities and radial pulses are 2/4. \par NEUROLOGIC:  Negative L'hirmitte, negative Spurling's sign. There are no pathologic reflexes. There is no decrease in sensation of the bilateral upper extremities on Wartenberg pinwheel examination.  Deep tendon reflexes are well-maintained at +2/4 of the bilateral upper extremities and are symmetric.  The previous global complaints of facial upper extremity and lower extremity radiculopathy are gone there is no pathologic reflexes the radiculopathy Endor perception of paresthesias may be a secondary effect from the doxycycline/side effect\par MUSCULOSKELETAL:  There is no visible muscular atrophy.  Manual motor strength is well maintained in the bilateral upper extremities.  Cervical range of motion is well maintained.  The patient ambulates in a non-myelopathic manner. Normal secondary orthopaedic exam of bilateral shoulders, elbows and hands.  Elbow flexion and extension, wrist extension, finger flexion and abduction are well maintained.\par \par   [de-identified] : MRI of the lumbar spine reviewed from [facility] imaging shows very small upper level lumbar disc protrusion etc. this is a normal lumbar MRI.\par \par Cervical and thoracic MRIs with and without contrast have also been reviewed there is 4 5 and 5 6 cervical degenerative disc disease and a separate thoracic syrinx formation possible myelomalacia with no direct cord compression.  This is with and without IV contrast.

## 2023-07-25 NOTE — HISTORY OF PRESENT ILLNESS
[de-identified] : Pleasant gentleman presents for cervical thoracic and lumbar MRI review patient has a global complaint of facial upper extremity and lower extremity radiculopathy.  ANGELO questionnaire is negative overall worsening with regard to her Lyme's disease presentation is pending a lumbar tap for further review.  Cervical as well as thoracic MRI pathology has been discovered patient is referred here for interpretation and surgical discussion with regard to MRI findings [Stable] : stable [1] : a current pain level of 1/10 [10] : a maximum pain level of 10/10 [Intermit.] : ~He/She~ states the symptoms seem to be intermittent [Bending] : worsened by bending [Lifting] : worsened by lifting [Rest] : relieved by rest [Ataxia] : no ataxia [Incontinence] : no incontinence [Loss of Dexterity] : good dexterity [Urinary Ret.] : no urinary retention

## 2023-07-26 ENCOUNTER — TRANSCRIPTION ENCOUNTER (OUTPATIENT)
Age: 43
End: 2023-07-26

## 2023-07-26 ENCOUNTER — OUTPATIENT (OUTPATIENT)
Dept: OUTPATIENT SERVICES | Facility: HOSPITAL | Age: 43
LOS: 1 days | End: 2023-07-26
Payer: COMMERCIAL

## 2023-07-26 VITALS
WEIGHT: 197.09 LBS | HEART RATE: 74 BPM | TEMPERATURE: 98 F | SYSTOLIC BLOOD PRESSURE: 130 MMHG | RESPIRATION RATE: 14 BRPM | OXYGEN SATURATION: 99 % | DIASTOLIC BLOOD PRESSURE: 78 MMHG | HEIGHT: 70 IN

## 2023-07-26 VITALS
SYSTOLIC BLOOD PRESSURE: 134 MMHG | DIASTOLIC BLOOD PRESSURE: 70 MMHG | HEART RATE: 71 BPM | RESPIRATION RATE: 16 BRPM | OXYGEN SATURATION: 99 % | TEMPERATURE: 98 F

## 2023-07-26 DIAGNOSIS — Z98.890 OTHER SPECIFIED POSTPROCEDURAL STATES: Chronic | ICD-10-CM

## 2023-07-26 DIAGNOSIS — A69.20 LYME DISEASE, UNSPECIFIED: ICD-10-CM

## 2023-07-26 LAB
APPEARANCE CSF: CLEAR — SIGNIFICANT CHANGE UP
COLOR CSF: SIGNIFICANT CHANGE UP
GLUCOSE CSF-MCNC: 60 MG/DL — SIGNIFICANT CHANGE UP (ref 40–70)
GRAM STN FLD: SIGNIFICANT CHANGE UP
LYMPHOCYTES # CSF: 0 % — LOW (ref 40–80)
NEUTROPHILS # CSF: 0 % — SIGNIFICANT CHANGE UP (ref 0–6)
NRBC NFR CSF: 2 /UL — SIGNIFICANT CHANGE UP (ref 0–5)
PROT CSF-MCNC: 63 MG/DL — HIGH (ref 15–45)
RBC # CSF: 0 /UL — SIGNIFICANT CHANGE UP (ref 0–0)
SPECIMEN SOURCE: SIGNIFICANT CHANGE UP
TUBE TYPE: SIGNIFICANT CHANGE UP

## 2023-07-26 PROCEDURE — 76000 FLUOROSCOPY <1 HR PHYS/QHP: CPT | Mod: 26,59

## 2023-07-26 PROCEDURE — 62328 DX LMBR SPI PNXR W/FLUOR/CT: CPT

## 2023-07-26 RX ORDER — FINASTERIDE 5 MG/1
1 TABLET, FILM COATED ORAL
Refills: 0 | DISCHARGE

## 2023-07-26 RX ORDER — GABAPENTIN 400 MG/1
1 CAPSULE ORAL
Refills: 0 | DISCHARGE

## 2023-07-26 RX ORDER — MONTELUKAST 4 MG/1
1 TABLET, CHEWABLE ORAL
Refills: 0 | DISCHARGE

## 2023-07-26 NOTE — ASU DISCHARGE PLAN (ADULT/PEDIATRIC) - CARE PROVIDER_API CALL
Jorge Luis Cee  Neurology  [address] [place] [address]  Phone: [phone]  Fax: [phone]  Established Patient  Follow Up Time:

## 2023-07-27 ENCOUNTER — TRANSCRIPTION ENCOUNTER (OUTPATIENT)
Age: 43
End: 2023-07-27
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
## 2023-07-27 LAB
ALBUMIN CSF-MCNC: 41 MG/DL — HIGH (ref 14–25)
ALBUMIN SERPL ELPH-MCNC: 3714 MG/DL — SIGNIFICANT CHANGE UP (ref 3500–5200)
IGG CSF-MCNC: 3.8 MG/DL — HIGH
IGG FLD-MCNC: 612 MG/DL — SIGNIFICANT CHANGE UP (ref 610–1660)
IGG SYNTH RATE SER+CSF CALC-MRATE: 1.9 MG/DAY — SIGNIFICANT CHANGE UP
IGG/ALB CLEAR SER+CSF-RTO: 0.6 — SIGNIFICANT CHANGE UP
IGG/ALB CSF: 0.09 RATIO — SIGNIFICANT CHANGE UP
IGG/ALB SER: 0.16 RATIO — SIGNIFICANT CHANGE UP

## 2023-07-28 LAB
INNER EAR 68KD AB FLD QL: <1.5 U/L — SIGNIFICANT CHANGE UP (ref 0–2.5)
MBP CSF-MCNC: 4 NG/ML — SIGNIFICANT CHANGE UP (ref 0–4.7)

## 2023-07-29 LAB
CULTURE RESULTS: NO GROWTH — SIGNIFICANT CHANGE UP
SPECIMEN SOURCE: SIGNIFICANT CHANGE UP

## 2023-07-30 ENCOUNTER — EMERGENCY (EMERGENCY)
Facility: HOSPITAL | Age: 43
LOS: 1 days | Discharge: HOME | End: 2023-07-30
Attending: EMERGENCY MEDICINE | Admitting: EMERGENCY MEDICINE
Payer: COMMERCIAL

## 2023-07-30 VITALS
SYSTOLIC BLOOD PRESSURE: 127 MMHG | RESPIRATION RATE: 18 BRPM | HEIGHT: 70 IN | TEMPERATURE: 98 F | DIASTOLIC BLOOD PRESSURE: 73 MMHG | OXYGEN SATURATION: 98 % | WEIGHT: 195.11 LBS | HEART RATE: 64 BPM

## 2023-07-30 VITALS
OXYGEN SATURATION: 99 % | DIASTOLIC BLOOD PRESSURE: 68 MMHG | TEMPERATURE: 98 F | SYSTOLIC BLOOD PRESSURE: 118 MMHG | RESPIRATION RATE: 16 BRPM | HEART RATE: 71 BPM

## 2023-07-30 DIAGNOSIS — Z98.890 OTHER SPECIFIED POSTPROCEDURAL STATES: Chronic | ICD-10-CM

## 2023-07-30 PROBLEM — A69.20 LYME DISEASE, UNSPECIFIED: Chronic | Status: ACTIVE | Noted: 2023-07-26

## 2023-07-30 PROBLEM — R20.0 ANESTHESIA OF SKIN: Chronic | Status: ACTIVE | Noted: 2023-07-20

## 2023-07-30 LAB
ALBUMIN SERPL ELPH-MCNC: 3.8 G/DL — SIGNIFICANT CHANGE UP (ref 3.3–5)
ALP SERPL-CCNC: 38 U/L — LOW (ref 40–120)
ALT FLD-CCNC: 34 U/L — SIGNIFICANT CHANGE UP (ref 12–78)
ANION GAP SERPL CALC-SCNC: 4 MMOL/L — LOW (ref 5–17)
APTT BLD: 33 SEC — SIGNIFICANT CHANGE UP (ref 24.5–35.6)
AST SERPL-CCNC: 12 U/L — LOW (ref 15–37)
BILIRUB SERPL-MCNC: 0.3 MG/DL — SIGNIFICANT CHANGE UP (ref 0.2–1.2)
BUN SERPL-MCNC: 11 MG/DL — SIGNIFICANT CHANGE UP (ref 7–23)
CALCIUM SERPL-MCNC: 8.9 MG/DL — SIGNIFICANT CHANGE UP (ref 8.5–10.1)
CHLORIDE SERPL-SCNC: 109 MMOL/L — HIGH (ref 96–108)
CO2 SERPL-SCNC: 30 MMOL/L — SIGNIFICANT CHANGE UP (ref 22–31)
CREAT SERPL-MCNC: 0.82 MG/DL — SIGNIFICANT CHANGE UP (ref 0.5–1.3)
EGFR: 113 ML/MIN/1.73M2 — SIGNIFICANT CHANGE UP
GLUCOSE SERPL-MCNC: 99 MG/DL — SIGNIFICANT CHANGE UP (ref 70–99)
HCT VFR BLD CALC: 40.7 % — SIGNIFICANT CHANGE UP (ref 39–50)
HGB BLD-MCNC: 13.6 G/DL — SIGNIFICANT CHANGE UP (ref 13–17)
INR BLD: 0.97 RATIO — SIGNIFICANT CHANGE UP (ref 0.85–1.18)
MCHC RBC-ENTMCNC: 30.1 PG — SIGNIFICANT CHANGE UP (ref 27–34)
MCHC RBC-ENTMCNC: 33.4 GM/DL — SIGNIFICANT CHANGE UP (ref 32–36)
MCV RBC AUTO: 90 FL — SIGNIFICANT CHANGE UP (ref 80–100)
NRBC # BLD: 0 /100 WBCS — SIGNIFICANT CHANGE UP (ref 0–0)
PLATELET # BLD AUTO: 306 K/UL — SIGNIFICANT CHANGE UP (ref 150–400)
POTASSIUM SERPL-MCNC: 4.1 MMOL/L — SIGNIFICANT CHANGE UP (ref 3.5–5.3)
POTASSIUM SERPL-SCNC: 4.1 MMOL/L — SIGNIFICANT CHANGE UP (ref 3.5–5.3)
PROT SERPL-MCNC: 6.9 G/DL — SIGNIFICANT CHANGE UP (ref 6–8.3)
PROTHROM AB SERPL-ACNC: 11.4 SEC — SIGNIFICANT CHANGE UP (ref 9.5–13)
RBC # BLD: 4.52 M/UL — SIGNIFICANT CHANGE UP (ref 4.2–5.8)
RBC # FLD: 13 % — SIGNIFICANT CHANGE UP (ref 10.3–14.5)
SODIUM SERPL-SCNC: 143 MMOL/L — SIGNIFICANT CHANGE UP (ref 135–145)
WBC # BLD: 8.38 K/UL — SIGNIFICANT CHANGE UP (ref 3.8–10.5)
WBC # FLD AUTO: 8.38 K/UL — SIGNIFICANT CHANGE UP (ref 3.8–10.5)

## 2023-07-30 PROCEDURE — 85027 COMPLETE CBC AUTOMATED: CPT

## 2023-07-30 PROCEDURE — 36415 COLL VENOUS BLD VENIPUNCTURE: CPT

## 2023-07-30 PROCEDURE — 96374 THER/PROPH/DIAG INJ IV PUSH: CPT

## 2023-07-30 PROCEDURE — 99285 EMERGENCY DEPT VISIT HI MDM: CPT

## 2023-07-30 PROCEDURE — 80053 COMPREHEN METABOLIC PANEL: CPT

## 2023-07-30 PROCEDURE — 85610 PROTHROMBIN TIME: CPT

## 2023-07-30 PROCEDURE — 99284 EMERGENCY DEPT VISIT MOD MDM: CPT | Mod: 25

## 2023-07-30 PROCEDURE — 85730 THROMBOPLASTIN TIME PARTIAL: CPT

## 2023-07-30 RX ORDER — CAFFEINE/SODIUM BENZOATE 250 MG/ML
500 VIAL (ML) INJECTION ONCE
Refills: 0 | Status: COMPLETED | OUTPATIENT
Start: 2023-07-30 | End: 2023-07-30

## 2023-07-30 RX ORDER — SODIUM CHLORIDE 9 MG/ML
1000 INJECTION INTRAMUSCULAR; INTRAVENOUS; SUBCUTANEOUS ONCE
Refills: 0 | Status: COMPLETED | OUTPATIENT
Start: 2023-07-30 | End: 2023-07-30

## 2023-07-30 RX ADMIN — SODIUM CHLORIDE 1000 MILLILITER(S): 9 INJECTION INTRAMUSCULAR; INTRAVENOUS; SUBCUTANEOUS at 12:34

## 2023-07-30 RX ADMIN — Medication 1002 MILLIGRAM(S): at 13:15

## 2023-07-30 NOTE — ED PROVIDER NOTE - NSFOLLOWUPINSTRUCTIONS_ED_ALL_ED_FT
Rest.  Avoid sitting as this position places stress/stretch on your "spinal cord membranes".  Do this until headache resolved.  Continue taking caffeine tablets as directed by Dr. Cee.  Follow-up with Dr. Cee this week and/or as directed/scheduled.  Return here if needed.

## 2023-07-30 NOTE — ED PROVIDER NOTE - PROGRESS NOTE DETAILS
Patient was seen and evaluated consultation took place between myself and anesthesia will be evaluating the patient to see if he is a candidate for blood patch. Consultation was performed by anesthesiology and considering risks and benefits behind a blood patch it was decided that since patient was feeling better conservative therapy would be best for this patient at this time.

## 2023-07-30 NOTE — ED PROVIDER NOTE - OBJECTIVE STATEMENT
Patient is a 42-year-old  white male with history of Lyme disease dating back to 2020 presently being evaluated and worked up for possible CNS Lyme versus other etiologies for persistent paresthesias and muscle tremors now presenting to the emergency department at [facility] complaining of postural headache beginning 36 hours after having an elective lumbar puncture for above described evaluation/work-up.  Headache is postural and that it is worse when standing better when lying down relieved somewhat with caffeine tablets.  No neck pain no fever no chills no photophobia.

## 2023-07-30 NOTE — PROGRESS NOTE ADULT - SUBJECTIVE AND OBJECTIVE BOX
Called to the ER by Dr. Taylor to evaluate patient for blood patch due to spinal headache.   Patient with no sig PMH underwent diagnostic fluoroscopic guided lumbar puncture by IR on 7/26/23.   Since then patient has been complaining of a positional headache, exacerbated when sitting up, improving when laying flat, able to tolerate walking and standing up but not sitting.  As per patient his pain was worse on last Friday morning but has been improving since then with conservative management: bed rest, hydration, caffeine pills, OTC pain medications.   Patient also claims to have a history of prolonged bleeding and easy bruising despite normal blood work.  Risks and benefits of performing blood patch in light of the abnormal bleeding history, which includes spinal epidural hematoma/ potential paralysis was thoroughly explained to patient, with his mother and wife present.  Patient opted to hold off on blood patch for now.    Recommendations made to patient and discussion held with ER attending, Dr. Taylor regarding continuing conservative management, obtaining hematology evaluation/clearance for bleeding disorder prior to any future spinal/epidural preocedures.  Patient to follow up with ER discharge instructions and recommendations.

## 2023-07-30 NOTE — ED PROVIDER NOTE - PATIENT PORTAL LINK FT
You can access the FollowMyHealth Patient Portal offered by [facility] by registering at the following website: [email or web address]. By joining [employer]’s FollowMyHealth portal, you will also be able to view your health information using other applications (apps) compatible with our system.

## 2023-07-30 NOTE — ED PROVIDER NOTE - PHYSICAL EXAMINATION
Examination reveals a well-developed well-nourished white male in no acute distress HEENT normocephalic atraumatic pupils equal round react to light accommodation extraocular is intact.  Neck is supple no meningismus.  Abdomen soft nontender positive bowel sounds.  Extremities are free from any clubbing cyanosis or edema.  Neurologically he is alert and oriented x4 without any focal neurologic deficits.

## 2023-07-30 NOTE — ED ADULT TRIAGE NOTE - CHIEF COMPLAINT QUOTE
Pt ambulatory to triage with steady gait c/o spinal headache since Thursday.  Pt states he had a lumbar puncture Wednesday to r/o lyme disease vs MS.   Pt developed a headache the following day and was told to f/u with neurology.   Today with persistent pain pt was told to come to ED by Dr Cee for blood patch.  Pt denies numbness/tingling, no weakness to limbs.   Pt denies visual changes/loss/dizziness.  Denies fevers at home.  No additional acute neuro symptoms.   Pt states headache is worse when sitting and improves when laying flat.   BN

## 2023-07-30 NOTE — ED PROVIDER NOTE - DIFFERENTIAL DIAGNOSIS
Differential Diagnosis Differential diagnosis includes spinal headache versus migraine headache versus traditional general headache versus meningitis doubt.

## 2023-07-30 NOTE — ED PROVIDER NOTE - CLINICAL SUMMARY MEDICAL DECISION MAKING FREE TEXT BOX
Headache beginning 36 hours after elective LP positional in nature here now requiring thorough evaluation labs medications as well as anesthesia consultation for possible blood patch.

## 2023-07-30 NOTE — ED ADULT NURSE NOTE - NSFALLUNIVINTERV_ED_ALL_ED
Bed/Stretcher in lowest position, wheels locked, appropriate side rails in place/Call bell, personal items and telephone in reach/Instruct patient to call for assistance before getting out of bed/chair/stretcher/Non-slip footwear applied when patient is off stretcher/Taylor to call system/Physically safe environment - no spills, clutter or unnecessary equipment/Purposeful proactive rounding/Room/bathroom lighting operational, light cord in reach

## 2023-07-30 NOTE — ED PROVIDER NOTE - CARE PROVIDER_API CALL
Jorge Luis Cee  Neurology  [address] [place] [address]  Phone: [phone]  Fax: [phone]  Follow Up Time:

## 2023-08-01 LAB — B BURGDOR AB CSF-ACNC: SIGNIFICANT CHANGE UP

## 2023-08-04 LAB — OLIGOCLONAL BANDS CSF ELPH-IMP: SIGNIFICANT CHANGE UP

## 2023-08-04 PROCEDURE — 82042 OTHER SOURCE ALBUMIN QUAN EA: CPT

## 2023-08-04 PROCEDURE — 82784 ASSAY IGA/IGD/IGG/IGM EACH: CPT

## 2023-08-04 PROCEDURE — 86617 LYME DISEASE ANTIBODY: CPT

## 2023-08-04 PROCEDURE — 87070 CULTURE OTHR SPECIMN AEROBIC: CPT

## 2023-08-04 PROCEDURE — 83873 ASSAY OF CSF PROTEIN: CPT

## 2023-08-04 PROCEDURE — 84157 ASSAY OF PROTEIN OTHER: CPT

## 2023-08-04 PROCEDURE — 62328 DX LMBR SPI PNXR W/FLUOR/CT: CPT

## 2023-08-04 PROCEDURE — 83916 OLIGOCLONAL BANDS: CPT

## 2023-08-04 PROCEDURE — 89051 BODY FLUID CELL COUNT: CPT

## 2023-08-04 PROCEDURE — 82945 GLUCOSE OTHER FLUID: CPT

## 2023-08-04 PROCEDURE — 82164 ANGIOTENSIN I ENZYME TEST: CPT

## 2023-08-04 PROCEDURE — 87205 SMEAR GRAM STAIN: CPT

## 2023-08-04 PROCEDURE — 82040 ASSAY OF SERUM ALBUMIN: CPT

## 2023-08-04 PROCEDURE — 76000 FLUOROSCOPY <1 HR PHYS/QHP: CPT

## 2023-08-21 ENCOUNTER — APPOINTMENT (OUTPATIENT)
Dept: NEUROLOGY | Facility: CLINIC | Age: 43
End: 2023-08-21

## 2023-08-30 LAB — BABESIA SPECIES PCR: NOT DETECTED

## 2023-12-07 ENCOUNTER — APPOINTMENT (OUTPATIENT)
Dept: ORTHOPEDIC SURGERY | Facility: CLINIC | Age: 43
End: 2023-12-07
Payer: COMMERCIAL

## 2023-12-07 VITALS
BODY MASS INDEX: 30.06 KG/M2 | SYSTOLIC BLOOD PRESSURE: 123 MMHG | HEART RATE: 90 BPM | HEIGHT: 70 IN | DIASTOLIC BLOOD PRESSURE: 81 MMHG | WEIGHT: 210 LBS

## 2023-12-07 DIAGNOSIS — M47.14 OTHER SPONDYLOSIS WITH MYELOPATHY, THORACIC REGION: ICD-10-CM

## 2023-12-07 PROCEDURE — 99214 OFFICE O/P EST MOD 30 MIN: CPT

## 2023-12-14 ENCOUNTER — APPOINTMENT (OUTPATIENT)
Dept: MRI IMAGING | Facility: CLINIC | Age: 43
End: 2023-12-14
Payer: COMMERCIAL

## 2023-12-14 PROCEDURE — 72157 MRI CHEST SPINE W/O & W/DYE: CPT

## 2023-12-14 PROCEDURE — A9585: CPT

## 2024-01-04 ENCOUNTER — APPOINTMENT (OUTPATIENT)
Dept: ORTHOPEDIC SURGERY | Facility: CLINIC | Age: 44
End: 2024-01-04
Payer: COMMERCIAL

## 2024-01-04 VITALS
BODY MASS INDEX: 30.06 KG/M2 | HEART RATE: 92 BPM | SYSTOLIC BLOOD PRESSURE: 132 MMHG | WEIGHT: 210 LBS | DIASTOLIC BLOOD PRESSURE: 85 MMHG | HEIGHT: 70 IN

## 2024-01-04 DIAGNOSIS — G95.0 SYRINGOMYELIA AND SYRINGOBULBIA: ICD-10-CM

## 2024-01-04 PROCEDURE — 99214 OFFICE O/P EST MOD 30 MIN: CPT

## 2024-01-04 NOTE — DISCUSSION/SUMMARY
[de-identified] : Based on MRI findings it is somewhat stable with regard to this potential syrinx Estephanie web/cyst type pathology again following up with Dr. Bolivar has been advised.  Patient was unable to make the previous appointments again following up definitively with I think Dr. Bolivar from an intradural perspective is best.

## 2024-01-04 NOTE — HISTORY OF PRESENT ILLNESS
[de-identified] :  very pleasant gentleman presents for review of the thoracic MRI and this was with and without contrast.  Again patient has not been evaluated by neurosurgery neurologically stable ANGELO question there is negative no lower extremity weakness no thoracic paresthesias [Stable] : stable [0] : a current pain level of 0/10 [2] : a maximum pain level of 2/10 [Intermit.] : ~He/She~ states the symptoms seem to be intermittent [Bending] : worsened by bending [Lifting] : worsened by lifting [Rest] : relieved by rest [Ataxia] : no ataxia [Incontinence] : no incontinence [Loss of Dexterity] : good dexterity [Urinary Ret.] : no urinary retention

## 2024-01-04 NOTE — PHYSICAL EXAM
[de-identified] :  CONSTITUTIONAL: The patient is a very pleasant individual who is well-nourished and who appears stated age. PSYCHIATRIC: The patient is alert and oriented X 3 and in no apparent distress, and participates with orthopedic evaluation well. HEAD: Atraumatic and is nonsyndromic in appearance. EENT: No visible thyromegaly, EOMI. RESPIRATORY: Respiratory rate is regular, not dyspneic on examination. LYMPHATICS: There is no inguinal lymphadenopathy INTEGUMENTARY: Skin is clean, dry, and intact about the bilateral lower extremities and lumbar spine. VASCULAR: There is brisk capillary refill about the bilateral lower extremities. NEUROLOGIC: There are no pathologic reflexes. There is no decrease in sensation of the bilateral lower extremities on manual  examination. Deep tendon reflexes are well maintained at 2+/4 of the bilateral lower extremities and are symmetric.. MUSCULOSKELETAL: There is no visible muscular atrophy. Manual motor strength is well maintained in the bilateral lower extremities. Range of motion of lumbar spine is well maintained. The patient ambulates in a non-myelopathic manner. Negative tension sign and straight leg raise bilaterally. Quad extension, ankle dorsiflexion, EHL, plantar flexion, and ankle eversion are well preserved. Normal secondary orthopaedic exam of bilateral hips, greater trochanteric area, knees and ankles. No pathologic reflexes and no thoracic back pain at this point in time  [de-identified] :   MRI from [facility] imaging from December and demonstrates stable thoracic pathology/arachnoid cyst syrinx etc.  This is from [facility] imaging.  This is with contrast showing no abnormal enhancement

## 2024-01-22 ENCOUNTER — NON-APPOINTMENT (OUTPATIENT)
Age: 44
End: 2024-01-22

## 2025-05-19 ENCOUNTER — NON-APPOINTMENT (OUTPATIENT)
Age: 45
End: 2025-05-19